# Patient Record
Sex: MALE | Race: WHITE | NOT HISPANIC OR LATINO | Employment: OTHER | URBAN - METROPOLITAN AREA
[De-identification: names, ages, dates, MRNs, and addresses within clinical notes are randomized per-mention and may not be internally consistent; named-entity substitution may affect disease eponyms.]

---

## 2017-05-11 ENCOUNTER — OFFICE VISIT (OUTPATIENT)
Dept: NEUROSURGERY | Facility: CLINIC | Age: 72
End: 2017-05-11

## 2017-05-11 VITALS — BODY MASS INDEX: 33.6 KG/M2 | HEIGHT: 71 IN | WEIGHT: 240 LBS

## 2017-05-11 DIAGNOSIS — G56.02 CARPAL TUNNEL SYNDROME OF LEFT WRIST: Primary | ICD-10-CM

## 2017-05-11 PROBLEM — G56.22 ULNAR NEUROPATHY OF LEFT UPPER EXTREMITY: Status: ACTIVE | Noted: 2017-05-11

## 2017-05-11 PROCEDURE — 99203 OFFICE O/P NEW LOW 30 MIN: CPT | Performed by: NEUROLOGICAL SURGERY

## 2017-05-11 RX ORDER — CEPHALEXIN 500 MG/1
CAPSULE ORAL
Refills: 0 | COMMUNITY
Start: 2017-04-17 | End: 2017-07-18

## 2017-05-11 RX ORDER — TAMSULOSIN HYDROCHLORIDE 0.4 MG/1
CAPSULE ORAL
Refills: 1 | COMMUNITY
Start: 2017-03-30 | End: 2017-07-18

## 2017-05-11 RX ORDER — DOFETILIDE 0.5 MG/1
CAPSULE ORAL
Refills: 3 | COMMUNITY
Start: 2017-05-01

## 2017-05-11 RX ORDER — SPIRONOLACTONE 25 MG/1
25 TABLET ORAL DAILY
Refills: 1 | COMMUNITY
Start: 2017-03-30

## 2017-05-11 RX ORDER — FINASTERIDE 5 MG/1
TABLET, FILM COATED ORAL
Refills: 1 | COMMUNITY
Start: 2017-03-30 | End: 2017-07-18

## 2017-05-11 RX ORDER — VALSARTAN 80 MG/1
80 TABLET ORAL 2 TIMES DAILY
Refills: 5 | COMMUNITY
Start: 2017-05-05

## 2017-05-11 RX ORDER — CARVEDILOL 12.5 MG/1
6.25 TABLET ORAL 2 TIMES DAILY WITH MEALS
Refills: 1 | COMMUNITY
Start: 2017-03-30

## 2017-05-11 RX ORDER — CYCLOBENZAPRINE HCL 10 MG
TABLET ORAL
Refills: 0 | COMMUNITY
Start: 2017-03-09 | End: 2017-07-18

## 2017-05-11 RX ORDER — ATORVASTATIN CALCIUM 40 MG/1
20 TABLET, FILM COATED ORAL NIGHTLY
Refills: 1 | COMMUNITY
Start: 2017-03-30

## 2017-06-01 ENCOUNTER — OFFICE VISIT (OUTPATIENT)
Dept: NEUROSURGERY | Facility: CLINIC | Age: 72
End: 2017-06-01

## 2017-06-01 ENCOUNTER — HOSPITAL ENCOUNTER (OUTPATIENT)
Dept: GENERAL RADIOLOGY | Facility: HOSPITAL | Age: 72
Discharge: HOME | End: 2017-06-01
Attending: NEUROLOGICAL SURGERY | Admitting: NEUROLOGICAL SURGERY

## 2017-06-01 VITALS — BODY MASS INDEX: 33.88 KG/M2 | WEIGHT: 242 LBS | HEIGHT: 71 IN | TEMPERATURE: 98.6 F

## 2017-06-01 DIAGNOSIS — G56.22 ULNAR NEUROPATHY OF LEFT UPPER EXTREMITY: ICD-10-CM

## 2017-06-01 DIAGNOSIS — G56.02 CARPAL TUNNEL SYNDROME OF LEFT WRIST: Primary | ICD-10-CM

## 2017-06-01 PROCEDURE — 99213 OFFICE O/P EST LOW 20 MIN: CPT | Performed by: NEUROLOGICAL SURGERY

## 2017-06-01 PROCEDURE — 73110 X-RAY EXAM OF WRIST: CPT

## 2017-06-01 RX ORDER — TRIAMCINOLONE ACETONIDE 1 MG/G
CREAM TOPICAL
Refills: 0 | COMMUNITY
Start: 2017-04-17 | End: 2017-07-18

## 2017-06-01 RX ORDER — FUROSEMIDE 20 MG/1
20 TABLET ORAL AS NEEDED
Refills: 5 | COMMUNITY
Start: 2017-03-30

## 2017-06-01 NOTE — PATIENT INSTRUCTIONS
Carpal Tunnel Syndrome  Carpal tunnel syndrome is a condition that causes pain in your hand and arm. The carpal tunnel is a narrow area that is on the palm side of your wrist. Repeated wrist motion or certain diseases may cause swelling in the tunnel. This swelling can pinch the main nerve in the wrist (median nerve).   HOME CARE  If You Have a Splint:  · Wear it as told by your doctor. Remove it only as told by your doctor.  · Loosen the splint if your fingers:  ¨ Become numb and tingle.  ¨ Turn blue and cold.  · Keep the splint clean and dry.  General Instructions   · Take over-the-counter and prescription medicines only as told by your doctor.  · Rest your wrist from any activity that may be causing your pain. If needed, talk to your employer about changes that can be made in your work, such as getting a wrist pad to use while typing.  · If directed, apply ice to the painful area:    Put ice in a plastic bag.    Place a towel between your skin and the bag.    Leave the ice on for 20 minutes, 2-3 times per day.  · Keep all follow-up visits as told by your doctor. This is important.  · Do any exercises as told by your doctor, physical therapist, or occupational therapist.  GET HELP IF:  · You have new symptoms.  · Medicine does not help your pain.  · Your symptoms get worse.     This information is not intended to replace advice given to you by your health care provider. Make sure you discuss any questions you have with your health care provider.     Document Released: 12/06/2012 Document Revised: 09/07/2016 Document Reviewed: 05/04/2016  [employer] Interactive Patient Education ©2017 [employer]    Carpal Tunnel Release  Carpal tunnel release is a surgical procedure to relieve numbness and pain in your hand that are caused by carpal tunnel syndrome. Your carpal tunnel is a narrow, hollow space in your wrist. It passes between your wrist bones and a band of connective tissue (transverse carpal ligament). The nerve  that supplies most of your hand (median nerve) passes through this space, and so do the connections between your fingers and the muscles of your arm (tendons). Carpal tunnel syndrome makes this space swell and become narrow, and this causes pain and numbness.  In carpal tunnel release surgery, a surgeon cuts through the transverse carpal ligament to make more room in the carpal tunnel space. You may have this surgery if other types of treatment have not worked.  LET YOUR HEALTH CARE PROVIDER KNOW ABOUT:  · Any allergies you have.  · All medicines you are taking, including vitamins, herbs, eye drops, creams, and over-the-counter medicines.  · Previous problems you or members of your family have had with the use of anesthetics.  · Any blood disorders you have.  · Previous surgeries you have had.  · Medical conditions you have.  RISKS AND COMPLICATIONS  Generally, this is a safe procedure. However, problems may occur, including:  · Bleeding.  · Infection.  · Injury to the median nerve.  · Need for additional surgery.  BEFORE THE PROCEDURE  · Ask your health care provider about:    Changing or stopping your regular medicines. This is especially important if you are taking diabetes medicines or blood thinners.    Taking medicines such as aspirin and ibuprofen. These medicines can thin your blood. Do not take these medicines before your procedure if your health care provider instructs you not to.  · Do not eat or drink anything after midnight on the night before the procedure or as directed by your health care provider.  · Plan to have someone take you home after the procedure.  PROCEDURE  · An IV tube may be inserted into a vein.  · You will be given one of the following:    A medicine that numbs the wrist area (local anesthetic). You may also be given a medicine to make you relax (sedative).    A medicine that makes you go to sleep (general anesthetic).  · Your arm, hand, and wrist will be cleaned with a germ-killing  solution (antiseptic).  · Your surgeon will make a surgical cut (incision) over the palm side of your wrist. The surgeon will pull aside the skin of your wrist to expose the carpal tunnel space.  · The surgeon will cut the transverse carpal ligament.  · The edges of the incision will be closed with stitches (sutures) or staples.  · A bandage (dressing) will be placed over your wrist and wrapped around your hand and wrist.  AFTER THE PROCEDURE  · You may spend some time in a recovery area.  · Your blood pressure, heart rate, breathing rate, and blood oxygen level will be monitored often until the medicines you were given have worn off.  · You will likely have some pain. You will be given pain medicine.  · You may need to wear a splint or a wrist brace over your dressing.     This information is not intended to replace advice given to you by your health care provider. Make sure you discuss any questions you have with your health care provider.     Document Released: 03/09/2005 Document Revised: 04/10/2017 Document Reviewed: 08/05/2015  [employer] Interactive Patient Education ©2017 [employer]  Carpal Tunnel Release, Care After  Refer to this sheet in the next few weeks. These instructions provide you with information about caring for yourself after your procedure. Your health care provider may also give you more specific instructions. Your treatment has been planned according to current medical practices, but problems sometimes occur. Call your health care provider if you have any problems or questions after your procedure.  WHAT TO EXPECT AFTER THE PROCEDURE  After your procedure, it is typical to have the following:  · Pain.  · Numbness.  · Tingling.  · Swelling.  · Stiffness.  · Bruising.  HOME CARE INSTRUCTIONS  · Take medicines only as directed by your health care provider.  · There are many different ways to close and cover an incision, including stitches (sutures), skin glue, and adhesive strips. Follow your  health care provider's instructions about:    Incision care.    Bandage (dressing) changes and removal.    Incision closure removal.  · Wear a splint or a brace as directed by your surgeon. You may need to do this for 2-3 weeks.  · Keep your hand raised (elevated) above the level of your heart while you are resting. Move your fingers often.  · Avoid activities that cause hand pain.  · Ask your surgeon when you can start to do all of your usual activities again, such as:    Driving.    Returning to work.    Bathing and swimming.  · Keep all follow-up visits as directed by your health care provider. This is important. You may need physical therapy for several months to speed healing and regain movement.  SEEK MEDICAL CARE IF:  · You have drainage, redness, swelling, or pain at your incision site.  · You have a fever.  · You have chills.  · Your pain medicine is not working.  · Your symptoms do not go away after 2 months.  · Your symptoms go away and then return.  SEEK IMMEDIATE MEDICAL CARE IF:  · You have pain or numbness that is getting worse.  · Your fingers change color.  · You are not able to move your fingers.     This information is not intended to replace advice given to you by your health care provider. Make sure you discuss any questions you have with your health care provider.     Document Released: 07/07/2006 Document Revised: 01/08/2016 Document Reviewed: 08/05/2015  [employer] Interactive Patient Education ©2017 [employer]      * Stop Xarelto 3 days prior to surgery *     If you have any questions in regards to your visit with  today, please do not hesitate to contact our office. Ask to speak with a [facility] for any clinical questions you may have.    Ronit Leslie, MISTY    [phone]

## 2017-06-01 NOTE — PROGRESS NOTES
Subjective   Lauro Rice is a 71 y.o. male who presents for follow up of left hand numbness and weakness.     The patient has had a progressive numbness and weakness of  in his left hand for the past year.  EMG NCV study in August 2016 showed findings in the left hand involving both the median and ulnar nerve.  The principal symptoms are in the median nerve distribution indicating carpal tunnel syndrome.  There is a possibility that there is also a ulnar nerve entrapment at the wrist level.    X-rays of his left wrist looked normal, even though he has history of a fracture from a basketball injury and surgery on that wrist on in the past.    The following portions of the patient's history were reviewed, updated as appropriate and approved: allergies, current medications, past family history, past medical history, past social history, past surgical history, review of systems and problem list.     Review of Systems   Constitutional: Negative for activity change, appetite change, chills, diaphoresis, fatigue, fever and unexpected weight change.   HENT: Negative for congestion, dental problem, drooling, ear discharge, ear pain, facial swelling, hearing loss, mouth sores, nosebleeds, postnasal drip, rhinorrhea, sinus pressure, sneezing, sore throat, tinnitus, trouble swallowing and voice change.    Eyes: Negative for photophobia, pain, discharge, redness, itching and visual disturbance.   Respiratory: Negative for apnea, cough, choking, chest tightness, shortness of breath, wheezing and stridor.    Cardiovascular: Negative for chest pain, palpitations and leg swelling.   Gastrointestinal: Negative for abdominal distention, abdominal pain, anal bleeding, blood in stool, constipation, diarrhea, nausea, rectal pain and vomiting.   Endocrine: Negative for cold intolerance, heat intolerance, polydipsia, polyphagia and polyuria.   Genitourinary: Negative for decreased urine volume, difficulty urinating, dysuria,  enuresis, flank pain, frequency, genital sores, hematuria and urgency.   Musculoskeletal: Negative for arthralgias, back pain, gait problem, joint swelling, myalgias, neck pain and neck stiffness.   Skin: Negative for color change, pallor, rash and wound.   Allergic/Immunologic: Negative for environmental allergies, food allergies and immunocompromised state.   Neurological: Negative for dizziness, tremors, seizures, syncope, facial asymmetry, speech difficulty, weakness, light-headedness, numbness and headaches.   Hematological: Negative for adenopathy. Does not bruise/bleed easily.   Psychiatric/Behavioral: Negative for agitation, behavioral problems, confusion, decreased concentration, dysphoric mood, hallucinations, self-injury, sleep disturbance and suicidal ideas. The patient is not nervous/anxious and is not hyperactive.    All other systems reviewed and are negative.      Objective    NEUROLOGICAL EXAMINATION:      Numbness of the left thumb, index and middle finger.  Weakness of the  of the left hand with interosseous atrophy between the thumb and index finger.    Assessment   Left carpal tunnel syndrome.  Possible left ulnar neuropathy at the wrist.       Plan   Left carpal tunnel release.  We will then see how symptoms resolve, since ulnar nerve release may not be necessary if carpal tunnel release is successful in relieving symptoms.        Adrián Hayward MD

## 2017-07-11 DIAGNOSIS — Z22.322 CARRIER OR SUSPECTED CARRIER OF METHICILLIN RESISTANT STAPHYLOCOCCUS AUREUS: ICD-10-CM

## 2017-07-11 DIAGNOSIS — R79.9 ABNORMAL FINDING OF BLOOD CHEMISTRY: ICD-10-CM

## 2017-07-11 DIAGNOSIS — G56.02 CARPAL TUNNEL SYNDROME OF LEFT WRIST: Primary | ICD-10-CM

## 2017-07-11 RX ORDER — CHLORHEXIDINE GLUCONATE 4 G/100ML
SOLUTION TOPICAL
Qty: 120 ML
Start: 2017-07-11 | End: 2017-07-18

## 2017-07-11 NOTE — H&P
HISTORY AND PHYSICAL for SURGERY    Referring Provider: Stephen Stephens DO    Chief complaint: Left hand numbness and weakness    Subjective .   History of present illness:    The patient has had a progressive numbness and weakness of  in his left hand for the past year. EMG NCV study in August 2016 showed findings in the left hand involving both the median and ulnar nerve. The principal symptoms are in the median nerve distribution indicating carpal tunnel syndrome. There is a possibility that there is also a ulnar nerve entrapment at the wrist level.     X-rays of his left wrist looked normal, even though he has history of a fracture from a basketball injury and surgery on that wrist on in the past.    History  Past Medical History:   Diagnosis Date   • A-fib    • Arthritis    • Heart disease    • Hypertension       Past Surgical History:   Procedure Laterality Date   • REPLACEMENT TOTAL KNEE Bilateral     family history includes Cancer in his father and mother.   Social History     Social History   • Marital status:      Spouse name: N/A   • Number of children: N/A   • Years of education: N/A     Social History Main Topics   • Smoking status: Never Smoker   • Smokeless tobacco: Not on file   • Alcohol use No   • Drug use: No   • Sexual activity: Defer     Other Topics Concern   • Not on file     Social History Narrative       Allergies:  Red dye and Sulfa antibiotics    Objective   Review of Systems  Constitutional: Negative for activity change, appetite change, chills, diaphoresis, fatigue, fever and unexpected weight change.   HENT: Negative for congestion, dental problem, drooling, ear discharge, ear pain, facial swelling, hearing loss, mouth sores, nosebleeds, postnasal drip, rhinorrhea, sinus pressure, sneezing, sore throat, tinnitus, trouble swallowing and voice change.   Eyes: Negative for photophobia, pain, discharge, redness, itching and visual disturbance.   Respiratory: Negative for apnea,  cough, choking, chest tightness, shortness of breath, wheezing and stridor.   Cardiovascular: Negative for chest pain, palpitations and leg swelling.   Gastrointestinal: Negative for abdominal distention, abdominal pain, anal bleeding, blood in stool, constipation, diarrhea, nausea, rectal pain and vomiting.   Endocrine: Negative for cold intolerance, heat intolerance, polydipsia, polyphagia and polyuria.   Genitourinary: Negative for decreased urine volume, difficulty urinating, dysuria, enuresis, flank pain, frequency, genital sores, hematuria and urgency.   Musculoskeletal: Negative for arthralgias, back pain, gait problem, joint swelling, myalgias, neck pain and neck stiffness.   Skin: Negative for color change, pallor, rash and wound.   Allergic/Immunologic: Negative for environmental allergies, food allergies and immunocompromised state.   Neurological: Negative for dizziness, tremors, seizures, syncope, facial asymmetry, speech difficulty, weakness, light-headedness, numbness and headaches.   Hematological: Negative for adenopathy. Does not bruise/bleed easily.   Psychiatric/Behavioral: Negative for agitation, behavioral problems, confusion, decreased concentration, dysphoric mood, hallucinations, self-injury, sleep disturbance and suicidal ideas. The patient is not nervous/anxious and is not hyperactive.   All other systems reviewed and are negative.    Physical Exam:  NEUROLOGICAL EXAMINATION:      Numbness of the left thumb, index and middle finger. Weakness of the  of the left hand with interosseous atrophy between the thumb and index finger.      Results Review:  EMG NCV study in August 2016 showed findings in the left hand involving both the median and ulnar nerve    Assessment/Plan   Assessment       Left carpal tunnel syndrome. Possible left ulnar neuropathy at the wrist.           Plan       Left carpal tunnel release. We will then see how symptoms resolve, since ulnar nerve release may not be  necessary if carpal tunnel release is successful in relieving symptoms.      Kelly Jiang PA-C  On behalf of Adrián Hayward MD  07/11/17  3:03 PM

## 2017-07-18 ENCOUNTER — APPOINTMENT (OUTPATIENT)
Dept: PREADMISSION TESTING | Facility: HOSPITAL | Age: 72
End: 2017-07-18

## 2017-07-18 VITALS — BODY MASS INDEX: 31.82 KG/M2 | HEIGHT: 71 IN | WEIGHT: 227.29 LBS

## 2017-07-18 DIAGNOSIS — R79.9 ABNORMAL FINDING OF BLOOD CHEMISTRY: ICD-10-CM

## 2017-07-18 DIAGNOSIS — G56.02 CARPAL TUNNEL SYNDROME OF LEFT WRIST: ICD-10-CM

## 2017-07-18 DIAGNOSIS — Z22.322 CARRIER OR SUSPECTED CARRIER OF METHICILLIN RESISTANT STAPHYLOCOCCUS AUREUS: ICD-10-CM

## 2017-07-18 LAB
ALBUMIN SERPL-MCNC: 4.3 G/DL (ref 3.2–4.8)
ALBUMIN/GLOB SERPL: 1.5 G/DL (ref 1.5–2.5)
ALP SERPL-CCNC: 68 U/L (ref 25–100)
ALT SERPL W P-5'-P-CCNC: 19 U/L (ref 7–40)
ANION GAP SERPL CALCULATED.3IONS-SCNC: 10 MMOL/L (ref 3–11)
AST SERPL-CCNC: 19 U/L (ref 0–33)
BILIRUB SERPL-MCNC: 0.9 MG/DL (ref 0.3–1.2)
BUN BLD-MCNC: 20 MG/DL (ref 9–23)
BUN/CREAT SERPL: 20 (ref 7–25)
CALCIUM SPEC-SCNC: 11 MG/DL (ref 8.7–10.4)
CHLORIDE SERPL-SCNC: 105 MMOL/L (ref 99–109)
CO2 SERPL-SCNC: 22 MMOL/L (ref 20–31)
CREAT BLD-MCNC: 1 MG/DL (ref 0.6–1.3)
DEPRECATED RDW RBC AUTO: 46.5 FL (ref 37–54)
ERYTHROCYTE [DISTWIDTH] IN BLOOD BY AUTOMATED COUNT: 12.9 % (ref 11.3–14.5)
GFR SERPL CREATININE-BSD FRML MDRD: 74 ML/MIN/1.73
GLOBULIN UR ELPH-MCNC: 2.8 GM/DL
GLUCOSE BLD-MCNC: 97 MG/DL (ref 70–100)
HBA1C MFR BLD: 5.4 % (ref 4.8–5.6)
HCT VFR BLD AUTO: 38.9 % (ref 38.9–50.9)
HGB BLD-MCNC: 12.9 G/DL (ref 13.1–17.5)
MCH RBC QN AUTO: 32.4 PG (ref 27–31)
MCHC RBC AUTO-ENTMCNC: 33.2 G/DL (ref 32–36)
MCV RBC AUTO: 97.7 FL (ref 80–99)
MRSA DNA SPEC QL NAA+PROBE: NEGATIVE
PLATELET # BLD AUTO: 171 10*3/MM3 (ref 150–450)
PMV BLD AUTO: 10.1 FL (ref 6–12)
POTASSIUM BLD-SCNC: 4.5 MMOL/L (ref 3.5–5.5)
PROT SERPL-MCNC: 7.1 G/DL (ref 5.7–8.2)
RBC # BLD AUTO: 3.98 10*6/MM3 (ref 4.2–5.76)
SODIUM BLD-SCNC: 137 MMOL/L (ref 132–146)
WBC NRBC COR # BLD: 5.28 10*3/MM3 (ref 3.5–10.8)

## 2017-07-18 PROCEDURE — 85027 COMPLETE CBC AUTOMATED: CPT | Performed by: NEUROLOGICAL SURGERY

## 2017-07-18 PROCEDURE — 93010 ELECTROCARDIOGRAM REPORT: CPT | Performed by: INTERNAL MEDICINE

## 2017-07-18 PROCEDURE — 80053 COMPREHEN METABOLIC PANEL: CPT | Performed by: NEUROLOGICAL SURGERY

## 2017-07-18 PROCEDURE — 83036 HEMOGLOBIN GLYCOSYLATED A1C: CPT | Performed by: NEUROLOGICAL SURGERY

## 2017-07-18 PROCEDURE — 36415 COLL VENOUS BLD VENIPUNCTURE: CPT

## 2017-07-18 PROCEDURE — 93005 ELECTROCARDIOGRAM TRACING: CPT

## 2017-07-18 PROCEDURE — 87641 MR-STAPH DNA AMP PROBE: CPT | Performed by: NEUROLOGICAL SURGERY

## 2017-07-18 RX ORDER — FEBUXOSTAT 40 MG/1
40 TABLET, FILM COATED ORAL DAILY
COMMUNITY

## 2017-07-19 ENCOUNTER — ANESTHESIA (OUTPATIENT)
Dept: PERIOP | Facility: HOSPITAL | Age: 72
End: 2017-07-19

## 2017-07-19 ENCOUNTER — HOSPITAL ENCOUNTER (OUTPATIENT)
Facility: HOSPITAL | Age: 72
Setting detail: HOSPITAL OUTPATIENT SURGERY
Discharge: HOME | End: 2017-07-19
Attending: NEUROLOGICAL SURGERY | Admitting: NEUROLOGICAL SURGERY

## 2017-07-19 ENCOUNTER — ANESTHESIA EVENT (OUTPATIENT)
Dept: PERIOP | Facility: HOSPITAL | Age: 72
End: 2017-07-19

## 2017-07-19 VITALS
HEIGHT: 71 IN | WEIGHT: 227 LBS | DIASTOLIC BLOOD PRESSURE: 93 MMHG | TEMPERATURE: 98.1 F | HEART RATE: 59 BPM | OXYGEN SATURATION: 96 % | RESPIRATION RATE: 20 BRPM | SYSTOLIC BLOOD PRESSURE: 152 MMHG | BODY MASS INDEX: 31.78 KG/M2

## 2017-07-19 DIAGNOSIS — G56.02 CARPAL TUNNEL SYNDROME OF LEFT WRIST: ICD-10-CM

## 2017-07-19 PROCEDURE — 64721 CARPAL TUNNEL SURGERY: CPT | Performed by: NEUROLOGICAL SURGERY

## 2017-07-19 PROCEDURE — 25010000002 PROPOFOL 10 MG/ML EMULSION: Performed by: NURSE ANESTHETIST, CERTIFIED REGISTERED

## 2017-07-19 RX ORDER — FENTANYL CITRATE 50 UG/ML
50 INJECTION, SOLUTION INTRAMUSCULAR; INTRAVENOUS
Status: DISCONTINUED | OUTPATIENT
Start: 2017-07-19 | End: 2017-07-19 | Stop reason: HOSPADM

## 2017-07-19 RX ORDER — SODIUM CHLORIDE, SODIUM LACTATE, POTASSIUM CHLORIDE, CALCIUM CHLORIDE 600; 310; 30; 20 MG/100ML; MG/100ML; MG/100ML; MG/100ML
9 INJECTION, SOLUTION INTRAVENOUS CONTINUOUS
Status: DISCONTINUED | OUTPATIENT
Start: 2017-07-19 | End: 2017-07-19 | Stop reason: HOSPADM

## 2017-07-19 RX ORDER — PROPOFOL 10 MG/ML
VIAL (ML) INTRAVENOUS AS NEEDED
Status: DISCONTINUED | OUTPATIENT
Start: 2017-07-19 | End: 2017-07-19 | Stop reason: SURG

## 2017-07-19 RX ORDER — PROMETHAZINE HYDROCHLORIDE 25 MG/1
25 SUPPOSITORY RECTAL ONCE AS NEEDED
Status: DISCONTINUED | OUTPATIENT
Start: 2017-07-19 | End: 2017-07-19 | Stop reason: HOSPADM

## 2017-07-19 RX ORDER — CEFAZOLIN SODIUM 2 G/100ML
2 INJECTION, SOLUTION INTRAVENOUS ONCE
Status: DISCONTINUED | OUTPATIENT
Start: 2017-07-19 | End: 2017-07-19 | Stop reason: HOSPADM

## 2017-07-19 RX ORDER — PROMETHAZINE HYDROCHLORIDE 25 MG/ML
6.25 INJECTION, SOLUTION INTRAMUSCULAR; INTRAVENOUS ONCE AS NEEDED
Status: DISCONTINUED | OUTPATIENT
Start: 2017-07-19 | End: 2017-07-19 | Stop reason: HOSPADM

## 2017-07-19 RX ORDER — MAGNESIUM HYDROXIDE 1200 MG/15ML
LIQUID ORAL AS NEEDED
Status: DISCONTINUED | OUTPATIENT
Start: 2017-07-19 | End: 2017-07-19 | Stop reason: HOSPADM

## 2017-07-19 RX ORDER — SODIUM CHLORIDE 0.9 % (FLUSH) 0.9 %
1-10 SYRINGE (ML) INJECTION AS NEEDED
Status: DISCONTINUED | OUTPATIENT
Start: 2017-07-19 | End: 2017-07-19 | Stop reason: HOSPADM

## 2017-07-19 RX ORDER — HYDROCODONE BITARTRATE AND ACETAMINOPHEN 7.5; 325 MG/1; MG/1
1 TABLET ORAL ONCE AS NEEDED
Status: DISCONTINUED | OUTPATIENT
Start: 2017-07-19 | End: 2017-07-19

## 2017-07-19 RX ORDER — ONDANSETRON 2 MG/ML
4 INJECTION INTRAMUSCULAR; INTRAVENOUS ONCE AS NEEDED
Status: DISCONTINUED | OUTPATIENT
Start: 2017-07-19 | End: 2017-07-19 | Stop reason: HOSPADM

## 2017-07-19 RX ORDER — PROPOFOL 10 MG/ML
VIAL (ML) INTRAVENOUS CONTINUOUS PRN
Status: DISCONTINUED | OUTPATIENT
Start: 2017-07-19 | End: 2017-07-19 | Stop reason: SURG

## 2017-07-19 RX ORDER — HYDROCODONE BITARTRATE AND ACETAMINOPHEN 5; 325 MG/1; MG/1
1 TABLET ORAL ONCE AS NEEDED
Status: COMPLETED | OUTPATIENT
Start: 2017-07-19 | End: 2017-07-19

## 2017-07-19 RX ORDER — FAMOTIDINE 10 MG/ML
20 INJECTION, SOLUTION INTRAVENOUS ONCE
Status: DISCONTINUED | OUTPATIENT
Start: 2017-07-19 | End: 2017-07-19

## 2017-07-19 RX ORDER — BUPIVACAINE HYDROCHLORIDE 2.5 MG/ML
INJECTION, SOLUTION EPIDURAL; INFILTRATION; INTRACAUDAL AS NEEDED
Status: DISCONTINUED | OUTPATIENT
Start: 2017-07-19 | End: 2017-07-19 | Stop reason: HOSPADM

## 2017-07-19 RX ORDER — PROMETHAZINE HYDROCHLORIDE 25 MG/1
25 TABLET ORAL ONCE AS NEEDED
Status: DISCONTINUED | OUTPATIENT
Start: 2017-07-19 | End: 2017-07-19 | Stop reason: HOSPADM

## 2017-07-19 RX ORDER — LIDOCAINE HYDROCHLORIDE 10 MG/ML
0.5 INJECTION, SOLUTION EPIDURAL; INFILTRATION; INTRACAUDAL; PERINEURAL ONCE AS NEEDED
Status: COMPLETED | OUTPATIENT
Start: 2017-07-19 | End: 2017-07-19

## 2017-07-19 RX ORDER — GLYCOPYRROLATE 0.2 MG/ML
INJECTION INTRAMUSCULAR; INTRAVENOUS AS NEEDED
Status: DISCONTINUED | OUTPATIENT
Start: 2017-07-19 | End: 2017-07-19 | Stop reason: SURG

## 2017-07-19 RX ORDER — HYDROMORPHONE HYDROCHLORIDE 1 MG/ML
0.5 INJECTION, SOLUTION INTRAMUSCULAR; INTRAVENOUS; SUBCUTANEOUS
Status: DISCONTINUED | OUTPATIENT
Start: 2017-07-19 | End: 2017-07-19 | Stop reason: HOSPADM

## 2017-07-19 RX ORDER — FAMOTIDINE 20 MG/1
20 TABLET, FILM COATED ORAL ONCE
Status: COMPLETED | OUTPATIENT
Start: 2017-07-19 | End: 2017-07-19

## 2017-07-19 RX ADMIN — SODIUM CHLORIDE, POTASSIUM CHLORIDE, SODIUM LACTATE AND CALCIUM CHLORIDE 9 ML/HR: 600; 310; 30; 20 INJECTION, SOLUTION INTRAVENOUS at 09:35

## 2017-07-19 RX ADMIN — HYDROCODONE BITARTRATE AND ACETAMINOPHEN 1 TABLET: 5; 325 TABLET ORAL at 14:33

## 2017-07-19 RX ADMIN — PROPOFOL 75 MCG/KG/MIN: 10 INJECTION, EMULSION INTRAVENOUS at 12:57

## 2017-07-19 RX ADMIN — GLYCOPYRROLATE 0.2 MG: 0.2 INJECTION, SOLUTION INTRAMUSCULAR; INTRAVENOUS at 13:16

## 2017-07-19 RX ADMIN — LIDOCAINE HYDROCHLORIDE 0.2 ML: 10 INJECTION, SOLUTION EPIDURAL; INFILTRATION; INTRACAUDAL; PERINEURAL at 09:35

## 2017-07-19 RX ADMIN — PROPOFOL 30 MG: 10 INJECTION, EMULSION INTRAVENOUS at 13:00

## 2017-07-19 RX ADMIN — PROPOFOL 50 MG: 10 INJECTION, EMULSION INTRAVENOUS at 12:57

## 2017-07-19 RX ADMIN — LIDOCAINE HYDROCHLORIDE 0.2 ML: 10 INJECTION, SOLUTION EPIDURAL; INFILTRATION; INTRACAUDAL; PERINEURAL at 09:40

## 2017-07-19 RX ADMIN — FAMOTIDINE 20 MG: 20 TABLET ORAL at 09:53

## 2017-07-19 NOTE — PLAN OF CARE
Problem: Patient Care Overview (Adult)  Goal: Plan of Care Review  Outcome: Ongoing (interventions implemented as appropriate)    07/19/17 0921   Coping/Psychosocial Response Interventions   Plan Of Care Reviewed With patient   Patient Care Overview   Progress improving

## 2017-07-19 NOTE — PLAN OF CARE
Problem: Patient Care Overview (Adult)  Goal: Adult Individualization and Mutuality  Outcome: Ongoing (interventions implemented as appropriate)    07/19/17 0921   Individualization   Patient Specific Preferences none   Patient Specific Goals none   Patient Specific Interventions none   Mutuality/Individual Preferences   What Anxieties, Fears or Concerns Do You Have About Your Health or Care? none   What Questions Do You Have About Your Health or Care? none   What Information Would Help Us Give You More Personalized Care? none

## 2017-07-19 NOTE — BRIEF OP NOTE
CARPAL TUNNEL RELEASE  Procedure Note    Luaro Rice  7/19/2017    Pre-op Diagnosis:   Carpal tunnel syndrome of left wrist [G56.02]    Post-op Diagnosis:     Post-Op Diagnosis Codes:     * Carpal tunnel syndrome of left wrist [G56.02]    Procedure/CPT® Codes:      Procedure(s):  LEFT CARPAL TUNNEL RELEASE    Surgeon(s):  Adrián Hayward MD    Anesthesia: Monitor Anesthesia Care with [facility]    Staff:   Circulator: Deandra Tejada RN; Devyn Lawson RN  Scrub Person: Helen Vallecillo  Nursing Assistant: Michael Maria  Assistant: Kelly Jiang PA-C    Estimated Blood Loss: *No blood loss documented*  Urine Voided: * No values recorded between 7/19/2017 12:08 PM and 7/19/2017  1:29 PM *    Specimens:                * No specimens in log *      Drains:           Findings: Left carpal tunnel narrowing    Complications: None      Adrián Hayward MD     Date: 7/19/2017  Time: 1:33 PM

## 2017-07-19 NOTE — ANESTHESIA POSTPROCEDURE EVALUATION
Patient: Lauro Rice    Procedure Summary     Date Anesthesia Start Anesthesia Stop Room / Location    07/19/17 1252 1340 [facility] / [facility]       Procedure Diagnosis Surgeon Provider    LEFT CARPAL TUNNEL RELEASE (Left Wrist) Carpal tunnel syndrome of left wrist  (Carpal tunnel syndrome of left wrist [G56.02]) MD Jose Beck MD          Anesthesia Type: MAC, [place] block  Last vitals  BP   152/93 (07/19/17 1430)    Temp   98.1 °F (36.7 °C) (07/19/17 1430)    Pulse   59 (07/19/17 1430)   Resp   20 (07/19/17 1430)    SpO2   96 % (07/19/17 1430)      Post Anesthesia Care and Evaluation    Patient location during evaluation: PACU  Patient participation: complete - patient participated  Level of consciousness: awake and alert  Pain score: 0  Pain management: adequate  Airway patency: patent  Anesthetic complications: No anesthetic complications  PONV Status: none  Cardiovascular status: hemodynamically stable and acceptable  Respiratory status: nonlabored ventilation, acceptable and nasal cannula  Hydration status: acceptable

## 2017-07-19 NOTE — PLAN OF CARE
Problem: Perioperative Period (Adult)  Goal: Signs and Symptoms of Listed Potential Problems Will be Absent or Manageable (Perioperative Period)  Outcome: Outcome(s) achieved Date Met:  07/19/17

## 2017-07-19 NOTE — OP NOTE
"OPERATIVE REPORT    DATE OF OPERATION: 7/19/17    PREOPERATIVE DIAGNOSIS: Left carpal tunnel syndrome    POSTOPERATIVE DIAGNOSIS: Left Carpal tunnel syndrome    PROCEDURE PERFORMED: Left carpal tunnel release    SURGEON: Adrián Hayward MD    ASSISTANT: Kelly Jiang PA-C    INDICATIONS: Left hand numbness and weakness with NCV evidence of carpal tunnel compression of the median nerve on the left.    DESCRIPTON OF PROCEDURE: Surgery was performed under local anesthesia using Marcaine supplemented with IV sedation provided by anesthesia.  The right hand and forearm were prepared sterilely, covered with a stockinette, and draped.  The proximal palmar and volar wrist surface were infiltrated with Marcaine and a skin incision made in the palmar crease between the thenar and hyperthenar eminence to the distal wrist crease. The incision was deepened with the #15 blade through the subcutaneous level and bipolar coagulation was used for hemostasis.  Finger retractors were used for exposure.  The palmaris aponeurosis was divided exposing the transverse carpal ligament, and the ligament was divided sharply using the #15 blade and fine tipped scissors.  The ligament was fully divided proximally and distally and confirmed visually. Bipolar coagulation was used for final hemostasis.    The retractors were removed.  Hemostasis was confirmed. The incision was closed with 3-0 Vicryl for the subcutaneous layer and a 3-0 running nylon closure for the skin. The blood loss was less then 10 mL. The incision was dressed with Telfa, gauze and a 1\" Jane roll.    Adrián Hayward M.D.    "

## 2017-07-19 NOTE — INTERVAL H&P NOTE
"H&P reviewed. The patient was examined and there are no changes to the H&P.   BP (!) 177/112 (BP Location: Right arm, Patient Position: Sitting)  Pulse 62  Temp 97.5 °F (36.4 °C) (Temporal Artery )   Resp 18  Ht 71\" (180.3 cm)  Wt 227 lb (103 kg)  SpO2 98%  BMI 31.66 kg/m2   Lungs clear  Heart regular rhythm (states he has been in NSR for 10 days)  Allergies:  Contrast dye; Red dye; and Sulfa antibiotics no allergy to latex  Immunizations  pneumo 2016  Flu 2016  Tetanus ?  Whitney Paris PA-C   7/18/17  10:05  "

## 2017-07-19 NOTE — PLAN OF CARE
Problem: Patient Care Overview (Adult)  Goal: Discharge Needs Assessment  Outcome: Outcome(s) achieved Date Met:  07/19/17

## 2017-07-19 NOTE — ANESTHESIA PREPROCEDURE EVALUATION
Anesthesia Evaluation     Patient summary reviewed and Nursing notes reviewed          Airway   Mallampati: II  TM distance: >3 FB  Neck ROM: full  no difficulty expected  Dental - normal exam     Pulmonary - negative pulmonary ROS and normal exam   Cardiovascular - normal exam    (+) hypertension, dysrhythmias Atrial Fib,       Neuro/Psych- negative ROS  GI/Hepatic/Renal/Endo    (+) obesity,      Musculoskeletal     Abdominal  - normal exam    Bowel sounds: normal.   Substance History - negative use     OB/GYN negative ob/gyn ROS         Other   (+) arthritis       Other Comment: Off xarelto 6 days                                Anesthesia Plan    ASA 3     MAC     intravenous induction   Anesthetic plan and risks discussed with patient.

## 2017-07-24 ENCOUNTER — TELEPHONE (OUTPATIENT)
Dept: NEUROSURGERY | Facility: CLINIC | Age: 72
End: 2017-07-24

## 2017-07-24 NOTE — TELEPHONE ENCOUNTER
Provider:  Mainor   Caller: SELF   Time of call:  1015am   Phone #: [phone]   Surgery:  LEFT CARPAL TUNNEL RELEASE  Surgery Date:  07/19/17  Last visit:   06/01/17  Next visit: 08/10/17-Apol[facility] p/o visit    LEDY:     07/19/2017 Hydrocodone/Acetaminophen  325MG/5MG  1945 30 7 Adrián Hayward [facility] 1    Reason for call:  Pt called in this morning leaving message stating his Left hand fingers are swelling at the knuckles. States he can not move them too well, they are stiff and giving him an achy feeling.    His Right hand fingers are fine.     States he has been doing ice rotations and taking his percocet 7.5mg Q6hrs.    Ice hasn't offered any relief of the swelling.    Incision site itself is perfect- per pt.   No signs of fever, chills or any other problems.     In the meantime I have instructed the pt to take some IBU- 800mg Q4hrs to see if that helps his inflammation.

## 2017-07-24 NOTE — TELEPHONE ENCOUNTER
I spoke with the patient about his symptoms and his swelling, it doesn't appear to be too awfully bad.  He has numbness like he had prior to surgery, wound is dry and intact without any swelling.  I have recommended that he elevate his wrist above the level of his heart, use an ice pack today and keep his arm extended without a bend at the elbow.  He's going to give us a call back in the office in a few hours.

## 2017-07-25 ENCOUNTER — TELEPHONE (OUTPATIENT)
Dept: NEUROSURGERY | Facility: CLINIC | Age: 72
End: 2017-07-25

## 2017-07-25 NOTE — TELEPHONE ENCOUNTER
I had him removed all the dressings, his incision is great. I think with continued conservative measures with elevation and ice, his symptoms  Should resolve. DC

## 2017-07-25 NOTE — TELEPHONE ENCOUNTER
Provider:  Mainor  Caller: patient  Time of call:   9:21  Phone #:  [phone]  Surgery:  L-CTR  Surgery Date:  07-19-17  Last visit:   same  Next visit: 08-10-17    LEDY:         Reason for call:     Patient called and states he has a nose bleed.  Please call.

## 2017-08-10 ENCOUNTER — OFFICE VISIT (OUTPATIENT)
Dept: NEUROSURGERY | Facility: CLINIC | Age: 72
End: 2017-08-10

## 2017-08-10 VITALS
SYSTOLIC BLOOD PRESSURE: 152 MMHG | HEIGHT: 71 IN | BODY MASS INDEX: 31.36 KG/M2 | WEIGHT: 224 LBS | DIASTOLIC BLOOD PRESSURE: 94 MMHG | TEMPERATURE: 96.5 F

## 2017-08-10 DIAGNOSIS — G56.22 ULNAR NEUROPATHY OF LEFT UPPER EXTREMITY: Primary | ICD-10-CM

## 2017-08-10 DIAGNOSIS — G56.02 CARPAL TUNNEL SYNDROME OF LEFT WRIST: ICD-10-CM

## 2017-08-10 PROCEDURE — 99024 POSTOP FOLLOW-UP VISIT: CPT | Performed by: PHYSICIAN ASSISTANT

## 2017-08-10 RX ORDER — METHYLPREDNISOLONE 4 MG/1
TABLET ORAL
Qty: 21 TABLET | Refills: 0 | Status: SHIPPED | OUTPATIENT
Start: 2017-08-10

## 2017-08-10 NOTE — PROGRESS NOTES
Lauro Rice is a 71 y.o. male who presents for a 3 week follow up appointment status post carpal tunnel release that took place on 07/19/2017.     HISTORY OF PRESENT ILLNESS and HOSPITAL COURSE:  The patient has had a progressive numbness and weakness of  in his left hand for the past year. EMG NCV study in August 2016 showed findings in the left hand involving both the median and ulnar nerve. The principal symptoms are in the median nerve distribution indicating carpal tunnel syndrome.  Left hand numbness and weakness with NCV evidence of carpal tunnel compression of the median nerve on the left.     In the post-operative 3 weeks the patient has had good relief of the symptoms experienced prior to surgery.  The surgical incision site is clean, dry, healed and the sutures were removed today in the office without incident.  He states his pain is now 3/10, which is a marked improvement prior to surgery.  He continues to have joint swelling and stiffness of his thumb and index finger as well as interosseous atrophy between the thumb and index finger.  He continues to have numbness of thumb and digits 4 and 5 of the left hand.  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    REVIEW OF SYSTEMS:  Review of Systems  Constitutional: Negative for activity change, appetite change, chills, diaphoresis, fatigue, fever and unexpected weight change.   HENT: Negative for congestion, dental problem, drooling, ear discharge, ear pain, facial swelling, hearing loss, mouth sores, nosebleeds, postnasal drip, rhinorrhea, sinus pressure, sneezing, sore throat, tinnitus, trouble swallowing and voice change.    Eyes: Negative for photophobia, pain, discharge, redness, itching and visual disturbance.   Respiratory: Negative for apnea, cough, choking, chest tightness, shortness of breath, wheezing and stridor.    Cardiovascular: Negative for chest pain,  palpitations and leg swelling.   Gastrointestinal: Negative for abdominal distention, abdominal pain, anal bleeding, blood in stool, constipation, diarrhea, nausea, rectal pain and vomiting.   Endocrine: Negative for cold intolerance, heat intolerance, polydipsia, polyphagia and polyuria.   Genitourinary: Negative for decreased urine volume, difficulty urinating, dysuria, enuresis, flank pain, frequency, genital sores, hematuria and urgency.   Musculoskeletal: Negative for arthralgias, back pain, gait problem, joint swelling, myalgias, neck pain and neck stiffness.   Skin: Negative for color change, pallor, rash and wound.   Allergic/Immunologic: Negative for environmental allergies, food allergies and immunocompromised state.   Neurological: Negative for dizziness, tremors, seizures, syncope, facial asymmetry, speech difficulty, weakness, light-headedness, numbness and headaches.   Hematological: Negative for adenopathy. Does not bruise/bleed easily.   Psychiatric/Behavioral: Negative for agitation, behavioral problems, confusion, decreased concentration, dysphoric mood, hallucinations, self-injury, sleep disturbance and suicidal ideas. The patient is not nervous/anxious and is not hyperactiv  The following portions of the patient's history were reviewed and updated as appropriate: allergies, current medications, past family history, past medical history, past social history, past surgical history and problem list.     PHYSICAL EXAM:  Physical Exam   Constitutional: He is oriented to person, place, and time. He appears well-developed and well-nourished. He is cooperative.   Neck: Trachea normal, full passive range of motion without pain and phonation normal.   Pulmonary/Chest: Effort normal.   Musculoskeletal:        Left hand: He exhibits decreased range of motion and swelling. Decreased sensation noted. Decreased sensation is present in the ulnar distribution. Decreased strength noted.   Neurological: He is alert  and oriented to person, place, and time. He displays atrophy. No cranial nerve deficit.   Skin: Skin is warm, dry and intact.   Psychiatric: He has a normal mood and affect. His behavior is normal. Judgment and thought content normal.        ASSESSMENT AND PLAN:  Mr. Rice is a delightful patient that is doing remarkably well in the postoperative period of time since his recent left carpal tunnel release.  He continues to have some hand and finger stiffness and swelling, especially of the digits 1 and 2 of the left hand.  He has a friend that is a physical therapist and will discuss some exercises that he can do at home to help with strengthening and range of motion.  He has a brace used previously for his symptoms of carpal tunnel syndrome and will utilize it during his upcoming trip to [place].      The patient will return in approximately 8 weeks for the next follow-up appointment with Kelly Jiang PA-C.  I instructed the patient to contact the office if concerns or questions arise following today's appointment.  Additional instructions were given to the patient regarding physical activity and pertinent restrictions at this point in time, which were acknowledged with full understanding by the patient.  It has been a pleasure providing neurosurgical care to this patient and we are grateful for the opportunity.

## 2017-10-12 ENCOUNTER — OFFICE VISIT (OUTPATIENT)
Dept: NEUROSURGERY | Facility: CLINIC | Age: 72
End: 2017-10-12

## 2017-10-12 VITALS
WEIGHT: 230 LBS | SYSTOLIC BLOOD PRESSURE: 142 MMHG | DIASTOLIC BLOOD PRESSURE: 86 MMHG | TEMPERATURE: 96.2 F | HEIGHT: 71 IN | BODY MASS INDEX: 32.2 KG/M2

## 2017-10-12 DIAGNOSIS — G56.02 CARPAL TUNNEL SYNDROME OF LEFT WRIST: Primary | ICD-10-CM

## 2017-10-12 PROCEDURE — 99024 POSTOP FOLLOW-UP VISIT: CPT | Performed by: PHYSICIAN ASSISTANT

## 2017-10-12 NOTE — PROGRESS NOTES
Lauro Rice is a 71 y.o. male who presents for a 3 month follow up appointment status post carpal tunnel release that took place on 07/19/2017    HISTORY OF PRESENT ILLNESS and HOSPITAL COURSE:  The patient has had a progressive numbness and weakness of  in his left hand for the past year. EMG NCV study in August 2016 showed findings in the left hand involving both the median and ulnar nerve. The principal symptoms are in the median nerve distribution indicating carpal tunnel syndrome.  Left hand numbness and weakness with NCV evidence of carpal tunnel compression of the median nerve on the left.  He underwent carpal tunnel release, left, on 07/19/17 and presents for an additional postoperative follow up appointment.  His continues to have numbness of digits 1 and 2 of the left hand as well as the thenar eminence, however his symptoms of pain in the left wrist and hand has resolved.  There is only a mild amount of swelling of the fingers and wrist, left side, which has improved significantly since he was last seen in August for his 3 week postoperative appointment.  He has been using a therapeutic putty to assist with  strengthening exercises.    REVIEW OF SYSTEMS:  Review of Systems   Constitutional: Negative for activity change, chills, fatigue and fever.   Musculoskeletal: Negative for arthralgias, joint swelling and myalgias.   Neurological: Negative for weakness and numbness.       The following portions of the patient's history were reviewed and updated as appropriate: allergies, current medications, past family history, past medical history, past social history, past surgical history and problem list.     PHYSICAL EXAM:  Physical Exam   Constitutional: He is oriented to person, place, and time. He appears well-developed and well-nourished.   Neck: Normal range of motion. Neck supple.   Pulmonary/Chest: Effort normal and breath sounds normal.   Musculoskeletal:        Right hand: He exhibits  decreased range of motion. Decreased strength noted. He exhibits thumb/finger opposition.   Neurological: He is alert and oriented to person, place, and time.   Skin: Skin is warm, dry and intact.   Psychiatric: He has a normal mood and affect. His behavior is normal. Thought content normal.        ASSESSMENT AND PLAN:  Mr. Rice was seen today for final postoperative follow up appointment status post carpal tunnel release that took place on 07/19/17.  His symptoms of pain that he experienced prior to surgery have resolved, however he does continue to have numbness of the thenar eminence and digits 1 and 2 of the left hand.  He will continue to participate in the home exercises that he has been doing and per his request, an order was provided for a transcutaneous electrical nerve stimulation unit to be utilized for additional postoperative recovery and rehabilitation.  This will serve as his final postoperative appointment, however,  I instructed the patient to contact the office if concerns or questions arise following today's appointment.  Additional instructions were given to the patient regarding physical activity and pertinent restrictions at this point in time, which were acknowledged with full understanding by the patient.  It has been a pleasure providing neurosurgical care to this patient and we are grateful for the opportunity.

## 2018-07-23 ENCOUNTER — TRANSCRIBE ORDERS (OUTPATIENT)
Dept: ADMINISTRATIVE | Facility: HOSPITAL | Age: 73
End: 2018-07-23

## 2018-07-23 DIAGNOSIS — R19.05 ABDOMINAL SWELLING, PERIUMBILICAL REGION: Primary | ICD-10-CM

## 2018-07-26 ENCOUNTER — HOSPITAL ENCOUNTER (OUTPATIENT)
Dept: CT IMAGING | Facility: HOSPITAL | Age: 73
Discharge: HOME | End: 2018-07-26
Admitting: FAMILY MEDICINE

## 2018-07-26 DIAGNOSIS — R19.05 ABDOMINAL SWELLING, PERIUMBILICAL REGION: ICD-10-CM

## 2018-07-26 PROCEDURE — 74150 CT ABDOMEN W/O CONTRAST: CPT | Performed by: RADIOLOGY

## 2018-07-26 PROCEDURE — 74150 CT ABDOMEN W/O CONTRAST: CPT

## 2018-07-27 ENCOUNTER — EPISODE CHANGES (OUTPATIENT)
Dept: CASE MANAGEMENT | Facility: OTHER | Age: 73
End: 2018-07-27

## 2018-09-18 ENCOUNTER — HOSPITAL ENCOUNTER (OUTPATIENT)
Dept: GENERAL RADIOLOGY | Facility: HOSPITAL | Age: 73
Discharge: HOME | End: 2018-09-18
Admitting: FAMILY MEDICINE

## 2018-09-18 ENCOUNTER — TRANSCRIBE ORDERS (OUTPATIENT)
Dept: ADMINISTRATIVE | Facility: HOSPITAL | Age: 73
End: 2018-09-18

## 2018-09-18 DIAGNOSIS — I42.0 CONGESTIVE CARDIOMYOPATHY (HCC): ICD-10-CM

## 2018-09-18 DIAGNOSIS — I42.0 CONGESTIVE CARDIOMYOPATHY (HCC): Primary | ICD-10-CM

## 2018-09-18 PROCEDURE — 71046 X-RAY EXAM CHEST 2 VIEWS: CPT

## 2018-09-18 PROCEDURE — 71046 X-RAY EXAM CHEST 2 VIEWS: CPT | Performed by: RADIOLOGY

## 2021-03-23 ENCOUNTER — OFFICE VISIT (OUTPATIENT)
Dept: UROLOGY | Facility: CLINIC | Age: 76
End: 2021-03-23

## 2021-03-23 VITALS — WEIGHT: 226 LBS | BODY MASS INDEX: 31.64 KG/M2 | TEMPERATURE: 97.3 F | HEIGHT: 71 IN

## 2021-03-23 DIAGNOSIS — N28.89 RENAL MASS: Primary | ICD-10-CM

## 2021-03-23 PROCEDURE — 99203 OFFICE O/P NEW LOW 30 MIN: CPT | Performed by: UROLOGY

## 2021-03-23 NOTE — PROGRESS NOTES
Chief Complaint:          Chief Complaint   Patient presents with   • KINDEY MASS       HPI:   75 y.o. male who was Dr. Artis his father-in-law referred from the nephrologist for a renal mass.  He has known chronic kidney disease stage IIIa.  He takes lots of Advil his creatinine went to 2.  He had a PSA of less than 0.1.  He brought films with him.  Indicating serum creatinine is 2.09 A1c is 5.7 PSA is less than 0.1 renal ultrasound shows a normal bladder.  And a questionable tiny angiomyolipoma.  You the ultrasound.  I will recommend an MRI for definitive visualization.  We discussed the fact that this is a very small benign renal mass observation would be indicated in either case      Past Medical History:        Past Medical History:   Diagnosis Date   • A-fib (CMS/HCC)    • Arthritis    • Heart disease    • Hypertension    • Low back pain    • MI, old     possible   • Wears glasses          Current Meds:     Current Outpatient Medications   Medication Sig Dispense Refill   • atorvastatin (LIPITOR) 40 MG tablet Take 20 mg by mouth Every Night.  1   • carvedilol (COREG) 12.5 MG tablet Take 6.25 mg by mouth 2 (Two) Times a Day With Meals.  1   • dofetilide (TIKOSYN) 500 MCG capsule TK 1 C PO BID  3   • febuxostat (ULORIC) 40 MG tablet Take 40 mg by mouth Daily.     • furosemide (LASIX) 20 MG tablet Take 20 mg by mouth As Needed.  5   • MethylPREDNISolone (MEDROL, ZENA,) 4 MG tablet Take as directed on package instructions. 21 tablet 0   • spironolactone (ALDACTONE) 25 MG tablet Take 25 mg by mouth Daily.  1   • valsartan (DIOVAN) 80 MG tablet Take 80 mg by mouth 2 (Two) Times a Day.  5   • rivaroxaban (XARELTO) 20 MG tablet Take 20 mg by mouth Daily With Dinner. Last dose on 7/13/17       No current facility-administered medications for this visit.        Allergies:      Allergies   Allergen Reactions   • Contrast Dye      Given during heart cath caused itching and swelling   • Red Dye      palpitations   • Sulfa  Antibiotics Itching        Past Surgical History:     Past Surgical History:   Procedure Laterality Date   • CARPAL TUNNEL RELEASE Left 7/19/2017    Procedure: LEFT CARPAL TUNNEL RELEASE;  Surgeon: Adrián Hayward MD;  Location: [facility];  Service:    • LEG SURGERY Right     TRIPPED ON SOMETHING, WOUND BECAME INFECTED, I&D WITH PACKING   • REPLACEMENT TOTAL KNEE Bilateral          Social History:     Social History     Socioeconomic History   • Marital status:      Spouse name: Not on file   • Number of children: Not on file   • Years of education: Not on file   • Highest education level: Not on file   Tobacco Use   • Smoking status: Never Smoker   • Smokeless tobacco: Never Used   Substance and Sexual Activity   • Alcohol use: No   • Drug use: No   • Sexual activity: Defer       Family History:     Family History   Problem Relation Age of Onset   • No Known Problems Mother    • No Known Problems Father        Review of Systems:     Review of Systems   Constitutional: Negative.    HENT: Negative.    Eyes: Negative.    Respiratory: Negative.    Cardiovascular: Negative.    Gastrointestinal: Negative.    Endocrine: Negative.    Musculoskeletal: Negative.    Allergic/Immunologic: Negative.    Neurological: Negative.    Hematological: Negative.    Psychiatric/Behavioral: Negative.        Physical Exam:     Physical Exam  Vitals and nursing note reviewed.   Constitutional:       Appearance: He is well-developed.   HENT:      Head: Normocephalic and atraumatic.   Eyes:      Conjunctiva/sclera: Conjunctivae normal.      Pupils: Pupils are equal, round, and reactive to light.   Cardiovascular:      Rate and Rhythm: Normal rate and regular rhythm.      Heart sounds: Normal heart sounds.   Pulmonary:      Effort: Pulmonary effort is normal.      Breath sounds: Normal breath sounds.   Abdominal:      General: Bowel sounds are normal.      Palpations: Abdomen is soft.   Musculoskeletal:         General: Normal range of  motion.      Cervical back: Normal range of motion.   Skin:     General: Skin is warm and dry.   Neurological:      Mental Status: He is alert and oriented to person, place, and time.      Deep Tendon Reflexes: Reflexes are normal and symmetric.   Psychiatric:         Behavior: Behavior normal.         Thought Content: Thought content normal.         Judgment: Judgment normal.         I have reviewed the following portions of the patient's history: allergies, current medications, past family history, past medical history, past social history, past surgical history, problem list and ROS and confirm it's accurate.      Procedure:       Assessment/Plan:   Angiomyolipoma-small renal mass recommend MRI for confirmation purposes will notify patient of same.  Stage IV chronic kidney disease follows with nephrology            Patient's Body mass index is 31.52 kg/m². BMI is above normal parameters. Recommendations include: educational material.              This document has been electronically signed by TREV LEARY MD March 23, 2021 14:39 EDT

## 2021-03-26 PROBLEM — N28.89 RENAL MASS: Status: ACTIVE | Noted: 2021-03-26

## 2024-11-19 ENCOUNTER — OFFICE VISIT (OUTPATIENT)
Dept: NEUROLOGY | Facility: CLINIC | Age: 79
End: 2024-11-19
Payer: MEDICARE

## 2024-11-19 VITALS
WEIGHT: 184 LBS | OXYGEN SATURATION: 97 % | SYSTOLIC BLOOD PRESSURE: 118 MMHG | BODY MASS INDEX: 25.76 KG/M2 | HEART RATE: 62 BPM | DIASTOLIC BLOOD PRESSURE: 88 MMHG | HEIGHT: 71 IN

## 2024-11-19 DIAGNOSIS — R25.1 TREMOR: Primary | ICD-10-CM

## 2024-11-19 PROCEDURE — 1159F MED LIST DOCD IN RCRD: CPT | Performed by: NURSE PRACTITIONER

## 2024-11-19 PROCEDURE — 99204 OFFICE O/P NEW MOD 45 MIN: CPT | Performed by: NURSE PRACTITIONER

## 2024-11-19 PROCEDURE — 1160F RVW MEDS BY RX/DR IN RCRD: CPT | Performed by: NURSE PRACTITIONER

## 2024-11-19 RX ORDER — HYDROCODONE BITARTRATE AND ACETAMINOPHEN 7.5; 325 MG/1; MG/1
0.5 TABLET ORAL
COMMUNITY

## 2024-11-19 RX ORDER — PREDNISONE 20 MG/1
20 TABLET ORAL
COMMUNITY

## 2024-11-19 RX ORDER — ALLOPURINOL 300 MG/1
300 TABLET ORAL DAILY
COMMUNITY

## 2024-11-19 RX ORDER — METOPROLOL SUCCINATE 50 MG/1
50 TABLET, EXTENDED RELEASE ORAL DAILY
COMMUNITY
Start: 2024-03-04 | End: 2025-03-04

## 2024-11-19 RX ORDER — CYCLOBENZAPRINE HCL 10 MG
10 TABLET ORAL
COMMUNITY

## 2024-11-19 RX ORDER — TAMSULOSIN HYDROCHLORIDE 0.4 MG/1
1 CAPSULE ORAL DAILY
COMMUNITY

## 2024-11-19 RX ORDER — CARBIDOPA AND LEVODOPA 25; 100 MG/1; MG/1
1 TABLET ORAL 3 TIMES DAILY
Qty: 90 TABLET | Refills: 3 | Status: SHIPPED | OUTPATIENT
Start: 2024-11-19

## 2024-11-19 RX ORDER — FINASTERIDE 5 MG/1
5 TABLET, FILM COATED ORAL DAILY
COMMUNITY

## 2024-11-19 NOTE — PROGRESS NOTES
Neuro Office Visit      Encounter Date: 2024   Patient Name: Lauro Rice  : 1945   MRN: [id number]   PCP:  Stephen Stephens DO     Chief Complaint:    Chief Complaint   Patient presents with    Tremors       History of Present Illness: Lauro Rice is a 79 y.o. male who is here today in Neurology for tremor.  History of Present Illness  The patient presents for evaluation of tremors. He is accompanied by his wife.    He has been experiencing tremors for over a year, initially in his right hand, but now also in his left. Tremors have progressively worsened, making it difficult for him to hold objects, use cutlery, and perform tasks such as buttoning, zipping, and tying. He also struggles with opening bottles or jars. He does not notice any tremors upon waking in the morning. He denies any facial or chin tremors.    He reports having vivid dreams that sometimes wake him up, but he does not act them out.     He experiences stiffness in his shoulders and back, which he attributes to arthritis.     His walking is affected by his back condition, causing him to shuffle due to fear of falling.     He feels frozen when transitioning from standing to moving.     He denies any difficulty swallowing.    He notes a deterioration in his vision over the past 6 to 9 months and has an upcoming appointment with an optic nerve specialist. His reading comprehension has declined, and his handwriting has become smaller and less legible.    He has been experiencing mild depression, which is unusual for him.          Subjective      Past Medical History:   Past Medical History:   Diagnosis Date    A-fib     Arthritis     Heart disease     Hypertension     Low back pain     MI, old     possible    Wears glasses        Past Surgical History:   Past Surgical History:   Procedure Laterality Date    CARPAL TUNNEL RELEASE Left 2017    Procedure: LEFT CARPAL TUNNEL RELEASE;  Surgeon: Adrián Hayward MD;  Location:   [facility];  Service:     LEG SURGERY Right     TRIPPED ON SOMETHING, WOUND BECAME INFECTED, I&D WITH PACKING    REPLACEMENT TOTAL KNEE Bilateral        Family History:   Family History   Problem Relation Age of Onset    No Known Problems Mother     No Known Problems Father        Social History:   Social History     Socioeconomic History    Marital status:    Tobacco Use    Smoking status: Never    Smokeless tobacco: Never   Substance and Sexual Activity    Alcohol use: No    Drug use: No    Sexual activity: Defer       Medications:     Current Outpatient Medications:     allopurinol (ZYLOPRIM) 300 MG tablet, Take 1 tablet by mouth Daily., Disp: , Rfl:     atorvastatin (LIPITOR) 40 MG tablet, Take 0.5 tablets by mouth Every Night., Disp: , Rfl: 1    cyclobenzaprine (FLEXERIL) 10 MG tablet, Take 1 tablet by mouth., Disp: , Rfl:     finasteride (PROSCAR) 5 MG tablet, Take 1 tablet by mouth Daily., Disp: , Rfl:     furosemide (LASIX) 20 MG tablet, Take 1 tablet by mouth As Needed., Disp: , Rfl: 5    HYDROcodone-acetaminophen (NORCO) 7.5-325 MG per tablet, 0.5 tablets., Disp: , Rfl:     metoprolol succinate XL (TOPROL-XL) 50 MG 24 hr tablet, Take 1 tablet by mouth Daily., Disp: , Rfl:     predniSONE (DELTASONE) 20 MG tablet, Take 1 tablet by mouth., Disp: , Rfl:     rivaroxaban (XARELTO) 15 MG tablet, Take 1 tablet by mouth Daily., Disp: , Rfl:     tamsulosin (FLOMAX) 0.4 MG capsule 24 hr capsule, Take 1 capsule by mouth Daily., Disp: , Rfl:     carbidopa-levodopa (SINEMET)  MG per tablet, Take 1 tablet by mouth 3 (Three) Times a Day., Disp: 90 tablet, Rfl: 3    Allergies:   Allergies   Allergen Reactions    Contrast Dye (Echo Or Unknown Ct/Mr)      Given during heart cath caused itching and swelling    Red Dye #40 (Allura Red)      palpitations    Sulfa Antibiotics Itching    Iodinated Contrast Media Swelling       PHQ-9 Total Score:     STEADI Fall Risk Assessment was completed, and patient is at HIGH risk  "for falls. Assessment completed on:11/19/2024    Objective     Physical Exam:     Neurological Exam  Mental Status  Awake, alert and oriented to person, place and time. Recent and remote memory are intact. Speech is normal. Language is fluent with no aphasia. Attention and concentration are normal. Fund of knowledge is appropriate for level of education.    Cranial Nerves  CN II: Visual acuity is normal.  CN III, IV, VI: Extraocular movements intact bilaterally. Pupils equal round and reactive to light bilaterally.  CN V: Facial sensation is normal.  CN VII: Full and symmetric facial movement.  CN IX, X: Palate elevates symmetrically  CN XI: Shoulder shrug strength is normal.  CN XII: Tongue midline without atrophy or fasciculations.    Motor  Normal muscle bulk throughout. No fasciculations present. Normal muscle tone. Strength is 5/5 throughout all four extremities.    Sensory  Sensation is intact to light touch, pinprick, vibration and proprioception in all four extremities.    Reflexes                                            Right                      Left  Brachioradialis                    2+                         2+  Biceps                                 2+                         2+  Triceps                                2+                         2+  Finger flex                           2+                         2+  Hamstring                            2+                         2+  Patellar                                2+                         2+  Achilles                                2+                         2+    Coordination    Finger-to-nose, rapid alternating movements and heel-to-shin normal bilaterally without dysmetria.    Gait  Casual gait: Shuffling gait.        Vital Signs:   Vitals:    11/19/24 1350   BP: 118/88   Pulse: 62   SpO2: 97%   Weight: 83.5 kg (184 lb)   Height: 180.3 cm (71\")     Body mass index is 25.66 kg/m².     Results:   Results       Imaging:   CT Head Without " Contrast    Result Date: 8/27/2024   1. No acute intracranial hematoma or hemorrhagic parenchymal contusion.  2. Acute bilateral scalp injury. No acute subjacent calvarial fracture.  Created by: Hardeep Murray MD  Signed by: Hardeep Murray MD  Signed on: 8/27/2024 5:58 PM  Location: [id number]            CT Maxillofacial Without Contrast    Result Date: 8/27/2024   1. No acutely displaced maxillofacial fracture.  2. Advanced cervical spondylosis results in multiple levels of at least moderate spinal canal stenosis and moderate to high-grade foraminal stenosis.  Created by: Hardeep Murray MD  Signed by: Hardeep Murray MD  Signed on: 8/27/2024 5:53 PM  Location: [id number]            XR Chest 1 View    Result Date: 8/27/2024   Airspace opacities in the medial right lung base consistent with atelectasis or infiltrate.  Stable prominence of the cardiac silhouette.  Created by: Dwight Jordan  Signed by: Dwight Jordan  Signed on: 8/27/2024 5:15 PM  Location: [id number]            CT Abdomen Pelvis Without Contrast    Result Date: 8/27/2024       1.   No acute posttraumatic findings in the abdomen or pelvis on unenhanced exam.      2.   Large right inguinal hernia containing peritoneal fat and a segment of the right ureter. No hydronephrosis.  3. Dilated ascending aorta measuring 4.3 cm.  4. Enlarged main pulmonary artery, which is associated with pulmonary arterial hypertension.       Created by: Gabriel Webber MD  Signed by: Gabriel Webber MD  Signed on: 8/27/2024 4:20 PM  Location: [id number]            XR Hand 3+ View Right    Result Date: 8/27/2024   1. No acute fracture or dislocation.  2. Osteoarthrosis.  3. Soft tissue swelling  4. Multifocal primarily periarticular soft tissue calcifications. Differential Diagnostic considerations to include calcinosis of renal failure in the appropriate clinical setting.  5. No radiopaque foreign body  Created by: Michel Cobb MD  Signed by: Michel Cobb MD  Signed on: 8/27/2024 3:46 PM  Location:  "[id number]            XR Wrist 3+ View Right    Result Date: 8/27/2024   1. No acute fracture.  2. Bulky periarticular calcifications that can be associated with hydroxyapatite deposition disease and/or gout.  Created by: Stephen Urbina DO  Signed by: Stephen Urbina DO  Signed on: 8/27/2024 3:42 PM  Location: [place] [id number]               Labs:   No results found for: \"CMP\", \"PROTEIN\", \"ANTIMOGAB\", \"LRGLBC1MWID\", \"JCVRESULT\", \"QUANTTBGOLD\", \"CBCDIF\", \"IGGALBSER\"     Assessment / Plan      Assessment/Plan:   Diagnoses and all orders for this visit:    1. Tremor (Primary)  Comments:  Start carbidopa-levodopa    Other orders  -     carbidopa-levodopa (SINEMET)  MG per tablet; Take 1 tablet by mouth 3 (Three) Times a Day.  Dispense: 90 tablet; Refill: 3         Assessment & Plan  1. Tremors.  The symptoms are suggestive of Parkinson's disease, although the presence of arthritis complicates the diagnosis. A DaTscan was recommended for further evaluation. A trial of carbidopa/levodopa 25/100 mg three times daily was initiated, with the first dose to be taken upon waking. Potential side effects, including nausea and stomach upset, were discussed. Feedback on the effectiveness of the medication will be provided after two weeks.    2. Depression.  He has experienced a little depression recently, which he has not had before. This is likely due to the accumulation of various health issues and the impact on his daily activities.    3. Arthritis.  The presence of arthritis in his hands and back complicates the diagnosis of Parkinson's disease. He has significant arthritis symptoms that affect his ability to perform daily tasks.    4. Atrial Fibrillation.  He has a history of atrial fibrillation, which comes and goes. His current medication is not working, and he is scheduled to see another doctor in February for further evaluation.    5. Vision Changes.  He has noticed worsening vision over the past 6 to 9 months. An " appointment with an optic nerve doctor is scheduled for this Thursday.    Follow-up  Return in 3 months for follow-up.    Patient Education:     Reviewed medications, potential side effects and signs and symptoms to report. Discussed risk versus benefits of treatment plan with patient and/or family-including medications, labs and radiology that may be ordered. Addressed questions and concerns during visit. Patient and/or family verbalized understanding and agree with plan. Instructed to call the office with any questions and report to ER with any life-threatening symptoms.     Follow Up:   Return in about 3 months (around 2/19/2025).    I spent 45 minutes caring for Lauro on this date of service. This time includes time spent by me in the following activities: preparing for the visit, reviewing tests, performing a medically appropriate examination and/or evaluation, counseling and educating the patient/family/caregiver, documenting information in the medical record, and ordering medications.        During this visit the following were done:  Labs Reviewed [x]    Labs Ordered []    Radiology Reports Reviewed [x]    Radiology Ordered []    PCP Records Reviewed [x]    Referring Provider Records Reviewed [x]    ER Records Reviewed []    Hospital Records Reviewed []    History Obtained From Family []    Radiology Images Reviewed []    Other Reviewed []    Records Requested []      Patient or patient representative verbalized consent for the use of Ambient Listening during the visit with  DEON Avila for chart documentation. 11/19/2024  15:59 EST    DEON Avila   [facility] NE[facility]  [address]  [facility] [address]  [phone]

## 2025-04-01 ENCOUNTER — OFFICE VISIT (OUTPATIENT)
Dept: NEUROLOGY | Facility: CLINIC | Age: 80
End: 2025-04-01
Payer: MEDICARE

## 2025-04-01 VITALS
BODY MASS INDEX: 24.64 KG/M2 | HEIGHT: 71 IN | SYSTOLIC BLOOD PRESSURE: 98 MMHG | WEIGHT: 176 LBS | DIASTOLIC BLOOD PRESSURE: 58 MMHG | OXYGEN SATURATION: 95 % | HEART RATE: 64 BPM

## 2025-04-01 DIAGNOSIS — R25.1 TREMOR: Primary | ICD-10-CM

## 2025-04-01 PROCEDURE — 1160F RVW MEDS BY RX/DR IN RCRD: CPT | Performed by: NURSE PRACTITIONER

## 2025-04-01 PROCEDURE — 99214 OFFICE O/P EST MOD 30 MIN: CPT | Performed by: NURSE PRACTITIONER

## 2025-04-01 PROCEDURE — 1159F MED LIST DOCD IN RCRD: CPT | Performed by: NURSE PRACTITIONER

## 2025-04-01 RX ORDER — MUPIROCIN 20 MG/G
OINTMENT TOPICAL 3 TIMES DAILY
COMMUNITY
Start: 2025-03-26 | End: 2025-04-05

## 2025-04-01 RX ORDER — ERYTHROMYCIN 5 MG/G
OINTMENT OPHTHALMIC
COMMUNITY

## 2025-04-01 RX ORDER — CARBIDOPA AND LEVODOPA 25; 100 MG/1; MG/1
2 TABLET ORAL 3 TIMES DAILY
Qty: 180 TABLET | Refills: 3 | Status: SHIPPED | OUTPATIENT
Start: 2025-04-01

## 2025-04-01 NOTE — PROGRESS NOTES
Neuro Office Visit      Encounter Date: 2025   Patient Name: Lauro Rice  : 1945   MRN: [id number]   PCP:  Stephen Stephens DO     Chief Complaint:    Chief Complaint   Patient presents with    Tremors       History of Present Illness: Lauro Rice is a 79 y.o. male who is here today in Neurology for tremor.    Last visit with me on 2024 started carbidopa-levodopa.  History of Present Illness  Accompanied by his wife.    Tremors persist and are managed with carbidopa-levadopa, though the midday dose is sometimes forgotten.     Initially, the medication seemed effective, but its efficacy has diminished over time.     No difficulty with swallowing is reported, but his voice intermittently becomes soft.     He experiences vivid dreams and occasional disorientation upon waking.     Gait remains largely unchanged, with shuffling and a slight forward lean noted while walking. Back discomfort is also reported and he feels that the pain could be responsible for the forward lean.     He is not interested in undergoing a DaTscan at this time.    Moodiness has increased.     A fall last week resulted in a hand injury with skin peeling and bleeding, exacerbated by blood thinner medication. The wound was treated with super glue but has been slow to heal.    Currently, he is awaiting the placement of a pacemaker and defibrillator by his cardiologist and is considering the Watchman procedure. He remains on blood thinners.    SOCIAL HISTORY  Marital Status:  for 54 years      MEDICATIONS  CURRENT MEDS:  Lexapro  PREVIOUS MEDS:  Coumadin  Xarelto      Subjective      Past Medical History:   Past Medical History:   Diagnosis Date    A-fib     Arthritis     Heart disease     Hypertension     Low back pain     MI, old     possible    Wears glasses        Past Surgical History:   Past Surgical History:   Procedure Laterality Date    CARPAL TUNNEL RELEASE Left 2017    Procedure: LEFT CARPAL  TUNNEL RELEASE;  Surgeon: Adrián Hayward MD;  Location: [facility];  Service:     LEG SURGERY Right     TRIPPED ON SOMETHING, WOUND BECAME INFECTED, I&D WITH PACKING    REPLACEMENT TOTAL KNEE Bilateral        Family History:   Family History   Problem Relation Age of Onset    No Known Problems Mother     No Known Problems Father        Social History:   Social History     Socioeconomic History    Marital status:    Tobacco Use    Smoking status: Never    Smokeless tobacco: Never   Substance and Sexual Activity    Alcohol use: No    Drug use: No    Sexual activity: Defer       Medications:     Current Outpatient Medications:     allopurinol (ZYLOPRIM) 300 MG tablet, Take 1 tablet by mouth Daily., Disp: , Rfl:     atorvastatin (LIPITOR) 40 MG tablet, Take 0.5 tablets by mouth Every Night., Disp: , Rfl: 1    carbidopa-levodopa (SINEMET)  MG per tablet, Take 2 tablets by mouth 3 (Three) Times a Day., Disp: 180 tablet, Rfl: 3    cyclobenzaprine (FLEXERIL) 10 MG tablet, Take 1 tablet by mouth., Disp: , Rfl:     erythromycin (ROMYCIN) 5 MG/GM ophthalmic ointment, APPLY 1 APPLICATION TO RIGHT EYE EVERY NIGHT., Disp: , Rfl:     finasteride (PROSCAR) 5 MG tablet, Take 1 tablet by mouth Daily., Disp: , Rfl:     furosemide (LASIX) 20 MG tablet, Take 1 tablet by mouth As Needed., Disp: , Rfl: 5    HYDROcodone-acetaminophen (NORCO) 7.5-325 MG per tablet, 0.5 tablets., Disp: , Rfl:     metoprolol succinate XL (TOPROL-XL) 50 MG 24 hr tablet, Take 1 tablet by mouth Daily., Disp: , Rfl:     mupirocin (BACTROBAN) 2 % ointment, Apply  topically to the appropriate area as directed 3 (Three) Times a Day., Disp: , Rfl:     predniSONE (DELTASONE) 20 MG tablet, Take 1 tablet by mouth As Needed., Disp: , Rfl:     rivaroxaban (XARELTO) 15 MG tablet, Take 1 tablet by mouth Daily., Disp: , Rfl:     tamsulosin (FLOMAX) 0.4 MG capsule 24 hr capsule, Take 1 capsule by mouth Daily., Disp: , Rfl:     Allergies:   Allergies   Allergen  Reactions    Contrast Dye (Echo Or Unknown Ct/Mr)      Given during heart cath caused itching and swelling    Red Dye #40 (Allura Red)      palpitations    Sulfa Antibiotics Itching    Iodinated Contrast Media Swelling       PHQ-9 Total Score:     STORMY Fall Risk Assessment was completed, and patient is at HIGH risk for falls. Assessment completed on:4/1/2025    Objective     Physical Exam:     Neurological Exam  Mental Status  Awake, alert and oriented to person, place and time. Recent and remote memory are intact. Speech is normal. Language is fluent with no aphasia. Attention and concentration are normal. Fund of knowledge is appropriate for level of education.    Cranial Nerves  CN II: Visual acuity is normal.  CN III, IV, VI: Extraocular movements intact bilaterally. Pupils equal round and reactive to light bilaterally.  CN V: Facial sensation is normal.  CN VII: Full and symmetric facial movement.  CN IX, X: Palate elevates symmetrically  CN XI: Shoulder shrug strength is normal.  CN XII: Tongue midline without atrophy or fasciculations.    Motor  Normal muscle bulk throughout. No fasciculations present. Normal muscle tone. The following abnormal movements were seen: Strength is 5/5 throughout all four extremities.  Coarse tremor in upper extremities.    Sensory  Sensation is intact to light touch, pinprick, vibration and proprioception in all four extremities.    Reflexes                                            Right                      Left  Brachioradialis                    2+                         2+  Biceps                                 2+                         2+  Triceps                                2+                         2+  Finger flex                           2+                         2+  Hamstring                            2+                         2+  Patellar                                2+                         2+  Achilles                                2+                "          2+    Coordination    Finger-to-nose, rapid alternating movements and heel-to-shin normal bilaterally without dysmetria.    Gait  Casual gait: Shuffling gait.        Vital Signs:   Vitals:    04/01/25 1158   BP: 98/58   Pulse: 64   SpO2: 95%   Weight: 79.8 kg (176 lb)   Height: 180.3 cm (71\")     Body mass index is 24.55 kg/m².     Results:   Results       Imaging:   CT Maxillofacial Without Contrast  Result Date: 1/29/2025          1.   No acute fracture.      2.   Small hematoma in the right cheek and around the right orbit.       Created by: Deirdre Mcbride MD  Signed by: Deirdre Mcbride MD  Signed on: 1/29/2025 10:55 PM  Location: [id number]            MRI Brain Without Contrast  Result Date: 1/14/2025   1. No acute CVA.  2. No intra-axial mass/vasogenic edema.  3. No acute to subacute intra-axial hematoma.  4. Small amount of supratentorial chronic small vessel white matter ischemia.  Created by: Rafi Rodriges DO  Signed by: Rafi Rodriges DO  Signed on: 1/14/2025 10:17 PM  Location: [id number]            US Carotid Bilateral  Result Date: 1/14/2025   Less than 50% diameter reduction is suggested of the internal carotid arteries bilaterally.  Mild amount of plaque is noted within the carotid bulb and proximal internal carotid arteries bilaterally.    Diagnostic criteria recommended by SRU (Society of Radiologists in Ultrasound) for Internal Carotid Artery Stenosis interpretation published from the Inter societal Accreditation Commission on Vascular Testing guidelines, Oct 2021.\"     Created by: Dwight Jordan  Signed by: Dwight Jordan  Signed on: 1/14/2025 5:14 PM  Location: [id number]               Labs:   No results found for: \"CMP\", \"PROTEIN\", \"ANTIMOGAB\", \"BYEPBU5MAXO\", \"JCVRESULT\", \"QUANTTBGOLD\", \"CBCDIF\", \"IGGALBSER\"     Assessment / Plan      Assessment/Plan:   Diagnoses and all orders for this visit:    1. Tremor (Primary)  Comments:  Increase carbidopa-levodopa    Other orders  -     carbidopa-levodopa (SINEMET)  MG per " tablet; Take 2 tablets by mouth 3 (Three) Times a Day.  Dispense: 180 tablet; Refill: 3         Assessment & Plan  1. Tremors.  The patient continues to experience tremors and sometimes forgets the midday dose of medication. The current medication dosage will be increased to 2 tablets, to be taken 3 times daily. He was informed that the increased dosage might cause nausea or stomach upset, and it is recommended to eat a small amount of food with each dose. If he experiences any issues with the increased dosage, such as persistent nausea, he should contact the office.    2. Mood changes.  He has been experiencing moodiness and depression. He mentioned these symptoms to his heart doctor recently. No specific changes to his current treatment plan were discussed during this visit.    3. Back pain.  He reports significant back pain, which may be contributing to his leaning forward while walking. No new treatment was discussed during this visit.    4. Hand injury.  He sustained a hand injury last week, resulting in skin peeling and bleeding due to his blood thinner medication. The wound was treated with super glue, but it has been taking a long time to heal.      Follow-up  The patient will follow up in 3 months.    Patient Education:     Reviewed medications, potential side effects and signs and symptoms to report. Discussed risk versus benefits of treatment plan with patient and/or family-including medications, labs and radiology that may be ordered. Addressed questions and concerns during visit. Patient and/or family verbalized understanding and agree with plan. Instructed to call the office with any questions and report to ER with any life-threatening symptoms.     Follow Up:   Return in about 3 months (around 7/1/2025).    I spent 30 minutes caring for Lauro on this date of service. This time includes time spent by me in the following activities: preparing for the visit, reviewing tests, performing a medically  appropriate examination and/or evaluation, counseling and educating the patient/family/caregiver, and documenting information in the medical record.        During this visit the following were done:  Labs Reviewed []    Labs Ordered []    Radiology Reports Reviewed [x]    Radiology Ordered []    PCP Records Reviewed []    Referring Provider Records Reviewed []    ER Records Reviewed [x]    Hospital Records Reviewed []    History Obtained From Family []    Radiology Images Reviewed []    Other Reviewed []    Records Requested []      Patient or patient representative verbalized consent for the use of Ambient Listening during the visit with  DEON Avila for chart documentation. 4/1/2025  11:47 EDT    DEON Avila   [facility] [place] [facility] NEUROLOGY  [address] [address] [phone]

## 2025-04-04 ENCOUNTER — TELEPHONE (OUTPATIENT)
Dept: NEUROLOGY | Facility: CLINIC | Age: 80
End: 2025-04-04
Payer: MEDICARE

## 2025-04-04 RX ORDER — ONDANSETRON 4 MG/1
4 TABLET, FILM COATED ORAL DAILY PRN
Qty: 30 TABLET | Refills: 1 | Status: SHIPPED | OUTPATIENT
Start: 2025-04-04 | End: 2026-04-04

## 2025-04-04 NOTE — TELEPHONE ENCOUNTER
PT SPOUSE CALLING - SHE SAYS HE HAS BEEN SICK SINCE THE INCREASE IN MEDICATION MADE AT LAST OV 4/1/25.  SHE SAYS SHE WAS TO CALL BACK IN AND PROVIDER WOULD SEND SOMETHING TO RX FOR HIM.    RX IS:    [facility] - [address] [phone] [facility] [phone] FX

## 2025-04-04 NOTE — TELEPHONE ENCOUNTER
"Relayed Provider message to Pt spouse,    \"I sent a prescription for Zofran to [facility] in [place].  Have him take one of the tablets about 30 minutes before taking the carbidopa-levodopa and see if that will help with the stomach upset.\"    Pt spouse verbalized understanding.    "

## 2025-04-04 NOTE — TELEPHONE ENCOUNTER
I sent a prescription for Zofran to [facility] in [place].  Have him take one of the tablets about 30 minutes before taking the carbidopa-levodopa and see if that will help with the stomach upset.

## 2025-05-19 ENCOUNTER — TRANSCRIBE ORDERS (OUTPATIENT)
Dept: ADMINISTRATIVE | Facility: HOSPITAL | Age: 80
End: 2025-05-19
Payer: MEDICARE

## 2025-05-19 DIAGNOSIS — I50.22 CHRONIC SYSTOLIC HEART FAILURE: ICD-10-CM

## 2025-05-19 DIAGNOSIS — I48.19 PERSISTENT ATRIAL FIBRILLATION: Primary | ICD-10-CM

## 2025-05-19 DIAGNOSIS — R53.83 TIREDNESS: ICD-10-CM

## 2025-05-23 ENCOUNTER — HOSPITAL ENCOUNTER (OUTPATIENT)
Dept: CARDIOLOGY | Facility: HOSPITAL | Age: 80
Discharge: HOME | End: 2025-05-23
Payer: MEDICARE

## 2025-05-23 DIAGNOSIS — I48.19 PERSISTENT ATRIAL FIBRILLATION: ICD-10-CM

## 2025-05-23 DIAGNOSIS — R53.83 TIREDNESS: ICD-10-CM

## 2025-05-23 DIAGNOSIS — I50.22 CHRONIC SYSTOLIC HEART FAILURE: ICD-10-CM

## 2025-05-23 LAB
AORTIC DIMENSIONLESS INDEX: 0.45 (DI)
AV MEAN PRESS GRAD SYS DOP V1V2: 3.5 MMHG
AV VMAX SYS DOP: 124.5 CM/SEC
BH CV ECHO MEAS - ACS: 1.8 CM
BH CV ECHO MEAS - AI P1/2T: 371.6 MSEC
BH CV ECHO MEAS - AO MAX PG: 6.2 MMHG
BH CV ECHO MEAS - AO ROOT DIAM: 3.4 CM
BH CV ECHO MEAS - AO V2 VTI: 21.6 CM
BH CV ECHO MEAS - AVA(I,D): 1.86 CM2
BH CV ECHO MEAS - EDV(CUBED): 150.6 ML
BH CV ECHO MEAS - EDV(MOD-SP2): 48.1 ML
BH CV ECHO MEAS - EDV(MOD-SP4): 80.3 ML
BH CV ECHO MEAS - EF(MOD-SP2): 20.6 %
BH CV ECHO MEAS - EF(MOD-SP4): 40.8 %
BH CV ECHO MEAS - ESV(MOD-SP2): 38.2 ML
BH CV ECHO MEAS - ESV(MOD-SP4): 47.5 ML
BH CV ECHO MEAS - IVS/LVPW: 0.88 CM
BH CV ECHO MEAS - IVSD: 1.04 CM
BH CV ECHO MEAS - LA DIMENSION: 4.8 CM
BH CV ECHO MEAS - LAT PEAK E' VEL: 4 CM/SEC
BH CV ECHO MEAS - LV DIASTOLIC VOL/BSA (35-75): 40.2 CM2
BH CV ECHO MEAS - LV MASS(C)D: 232 GRAMS
BH CV ECHO MEAS - LV MAX PG: 1.59 MMHG
BH CV ECHO MEAS - LV MEAN PG: 1 MMHG
BH CV ECHO MEAS - LV SYSTOLIC VOL/BSA (12-30): 23.8 CM2
BH CV ECHO MEAS - LV V1 MAX: 63 CM/SEC
BH CV ECHO MEAS - LV V1 VTI: 9.7 CM
BH CV ECHO MEAS - LVIDD: 5.3 CM
BH CV ECHO MEAS - LVOT AREA: 4.2 CM2
BH CV ECHO MEAS - LVOT DIAM: 2.3 CM
BH CV ECHO MEAS - LVPWD: 1.18 CM
BH CV ECHO MEAS - MED PEAK E' VEL: 3.6 CM/SEC
BH CV ECHO MEAS - MV A MAX VEL: 38.1 CM/SEC
BH CV ECHO MEAS - MV DEC SLOPE: 135 CM/SEC2
BH CV ECHO MEAS - MV E MAX VEL: 71.1 CM/SEC
BH CV ECHO MEAS - MV E/A: 1.87
BH CV ECHO MEAS - MV P1/2T: 127.4 MSEC
BH CV ECHO MEAS - MVA(P1/2T): 1.73 CM2
BH CV ECHO MEAS - PA ACC TIME: 0.09 SEC
BH CV ECHO MEAS - PA V2 MAX: 79.6 CM/SEC
BH CV ECHO MEAS - RAP SYSTOLE: 10 MMHG
BH CV ECHO MEAS - RVDD: 3.1 CM
BH CV ECHO MEAS - RVSP: 59 MMHG
BH CV ECHO MEAS - SV(LVOT): 40.1 ML
BH CV ECHO MEAS - SV(MOD-SP2): 9.9 ML
BH CV ECHO MEAS - SV(MOD-SP4): 32.8 ML
BH CV ECHO MEAS - SVI(LVOT): 20.1 ML/M2
BH CV ECHO MEAS - SVI(MOD-SP2): 5 ML/M2
BH CV ECHO MEAS - SVI(MOD-SP4): 16.4 ML/M2
BH CV ECHO MEAS - TAPSE (>1.6): 1.57 CM
BH CV ECHO MEAS - TR MAX PG: 49 MMHG
BH CV ECHO MEAS - TR MAX VEL: 350 CM/SEC
BH CV ECHO MEASUREMENTS AVERAGE E/E' RATIO: 18.71
LEFT ATRIUM VOLUME INDEX: 30.2 ML/M2
LV EF BIPLANE MOD: 34 %

## 2025-05-23 PROCEDURE — 93306 TTE W/DOPPLER COMPLETE: CPT

## 2025-05-23 PROCEDURE — 93356 MYOCRD STRAIN IMG SPCKL TRCK: CPT

## 2025-05-27 LAB
AORTIC DIMENSIONLESS INDEX: 0.45 (DI)
AV MEAN PRESS GRAD SYS DOP V1V2: 3.5 MMHG
AV VMAX SYS DOP: 124.5 CM/SEC
BH CV ECHO MEAS - ACS: 1.8 CM
BH CV ECHO MEAS - AI P1/2T: 371.6 MSEC
BH CV ECHO MEAS - AO MAX PG: 6.2 MMHG
BH CV ECHO MEAS - AO ROOT DIAM: 3.4 CM
BH CV ECHO MEAS - AO V2 VTI: 21.6 CM
BH CV ECHO MEAS - AVA(I,D): 1.86 CM2
BH CV ECHO MEAS - EDV(CUBED): 150.6 ML
BH CV ECHO MEAS - EDV(MOD-SP2): 48.1 ML
BH CV ECHO MEAS - EDV(MOD-SP4): 80.3 ML
BH CV ECHO MEAS - EF(MOD-SP2): 20.6 %
BH CV ECHO MEAS - EF(MOD-SP4): 40.8 %
BH CV ECHO MEAS - ESV(MOD-SP2): 38.2 ML
BH CV ECHO MEAS - ESV(MOD-SP4): 47.5 ML
BH CV ECHO MEAS - IVS/LVPW: 0.88 CM
BH CV ECHO MEAS - IVSD: 1.04 CM
BH CV ECHO MEAS - LA DIMENSION: 4.8 CM
BH CV ECHO MEAS - LAT PEAK E' VEL: 4 CM/SEC
BH CV ECHO MEAS - LV DIASTOLIC VOL/BSA (35-75): 40.2 CM2
BH CV ECHO MEAS - LV MASS(C)D: 232 GRAMS
BH CV ECHO MEAS - LV MAX PG: 1.59 MMHG
BH CV ECHO MEAS - LV MEAN PG: 1 MMHG
BH CV ECHO MEAS - LV SYSTOLIC VOL/BSA (12-30): 23.8 CM2
BH CV ECHO MEAS - LV V1 MAX: 63 CM/SEC
BH CV ECHO MEAS - LV V1 VTI: 9.7 CM
BH CV ECHO MEAS - LVIDD: 5.3 CM
BH CV ECHO MEAS - LVOT AREA: 4.2 CM2
BH CV ECHO MEAS - LVOT DIAM: 2.3 CM
BH CV ECHO MEAS - LVPWD: 1.18 CM
BH CV ECHO MEAS - MED PEAK E' VEL: 3.6 CM/SEC
BH CV ECHO MEAS - MV A MAX VEL: 38.1 CM/SEC
BH CV ECHO MEAS - MV DEC SLOPE: 135 CM/SEC2
BH CV ECHO MEAS - MV E MAX VEL: 71.1 CM/SEC
BH CV ECHO MEAS - MV E/A: 1.87
BH CV ECHO MEAS - MV P1/2T: 127.4 MSEC
BH CV ECHO MEAS - MVA(P1/2T): 1.73 CM2
BH CV ECHO MEAS - PA ACC TIME: 0.09 SEC
BH CV ECHO MEAS - PA V2 MAX: 79.6 CM/SEC
BH CV ECHO MEAS - RVDD: 3.1 CM
BH CV ECHO MEAS - SV(LVOT): 40.1 ML
BH CV ECHO MEAS - SV(MOD-SP2): 9.9 ML
BH CV ECHO MEAS - SV(MOD-SP4): 32.8 ML
BH CV ECHO MEAS - SVI(LVOT): 20.1 ML/M2
BH CV ECHO MEAS - SVI(MOD-SP2): 5 ML/M2
BH CV ECHO MEAS - SVI(MOD-SP4): 16.4 ML/M2
BH CV ECHO MEAS - TAPSE (>1.6): 1.57 CM
BH CV ECHO MEAS - TR MAX PG: 49 MMHG
BH CV ECHO MEAS - TR MAX VEL: 350 CM/SEC
BH CV ECHO MEASUREMENTS AVERAGE E/E' RATIO: 18.71
LEFT ATRIUM VOLUME INDEX: 30.2 ML/M2
LV EF BIPLANE MOD: 34 %

## 2025-08-07 ENCOUNTER — OFFICE VISIT (OUTPATIENT)
Facility: HOSPITAL | Age: 80
End: 2025-08-07
Payer: MEDICARE

## 2025-08-07 VITALS
WEIGHT: 174 LBS | DIASTOLIC BLOOD PRESSURE: 60 MMHG | SYSTOLIC BLOOD PRESSURE: 116 MMHG | OXYGEN SATURATION: 97 % | HEIGHT: 71 IN | HEART RATE: 63 BPM | BODY MASS INDEX: 24.36 KG/M2

## 2025-08-07 DIAGNOSIS — R25.1 TREMOR: Primary | ICD-10-CM

## 2025-08-07 DIAGNOSIS — F41.9 ANXIETY: ICD-10-CM

## 2025-08-07 PROCEDURE — G0463 HOSPITAL OUTPT CLINIC VISIT: HCPCS | Performed by: NURSE PRACTITIONER

## 2025-08-07 RX ORDER — CARBIDOPA AND LEVODOPA 25; 100 MG/1; MG/1
1 TABLET ORAL 3 TIMES DAILY
Qty: 180 TABLET | Refills: 3 | Status: SHIPPED | OUTPATIENT
Start: 2025-08-07

## 2025-08-07 RX ORDER — ESCITALOPRAM OXALATE 10 MG/1
10 TABLET ORAL DAILY
Qty: 30 TABLET | Refills: 6 | Status: SHIPPED | OUTPATIENT
Start: 2025-08-07 | End: 2026-08-07

## 2025-08-07 RX ORDER — RIVAROXABAN 15 MG/1
15 TABLET, FILM COATED ORAL DAILY
COMMUNITY
Start: 2025-07-03

## 2025-08-07 RX ORDER — HYDROXYZINE HYDROCHLORIDE 25 MG/1
25 TABLET, FILM COATED ORAL
COMMUNITY
Start: 2025-07-22

## 2025-08-18 RX ORDER — ONDANSETRON 4 MG/1
4 TABLET, FILM COATED ORAL DAILY PRN
Qty: 90 TABLET | Refills: 3 | Status: SHIPPED | OUTPATIENT
Start: 2025-08-18 | End: 2026-08-18

## (undated) DEVICE — STCKNT STRL 4X48 IN

## (undated) DEVICE — SUT ETHLN 4/0 PS2 18IN 1667H

## (undated) DEVICE — DISPOSABLE BIPOLAR FORCEPS 5 1/2" (14CM) SEMKIN, 0.7MM TIP AND 12 FT. (3.6M) CABLE: Brand: KIRWAN

## (undated) DEVICE — ENCORE® LATEX MICRO SIZE 8, STERILE LATEX POWDER-FREE SURGICAL GLOVE: Brand: ENCORE

## (undated) DEVICE — SPNG GZ STRL 2S 4X4 12PLY

## (undated) DEVICE — ANTIBACTERIAL UNDYED BRAIDED (POLYGLACTIN 910), SYNTHETIC ABSORBABLE SUTURE: Brand: COATED VICRYL

## (undated) DEVICE — CANN NASL CO2 DIVIDED A/

## (undated) DEVICE — SPNG GZ WOVN 4X4IN 12PLY 10/BX STRL

## (undated) DEVICE — ENCORE® LATEX MICRO SIZE 7, STERILE LATEX POWDER-FREE SURGICAL GLOVE: Brand: ENCORE

## (undated) DEVICE — 2963 MEDIPORE SOFT CLOTH TAPE 3 IN X 10 YD 12 RLS/CS: Brand: 3M™ MEDIPORE™

## (undated) DEVICE — ENCORE® LATEX MICRO SIZE 6.5, STERILE LATEX POWDER-FREE SURGICAL GLOVE: Brand: ENCORE

## (undated) DEVICE — PK EXTREM UPPR 10

## (undated) DEVICE — APPL CHLORAPREP W/TINT 26ML BLU

## (undated) DEVICE — DRSNG TELFA PAD NONADH STR 1S 3X8IN

## (undated) DEVICE — SYR CONTRL LUERLOK 10CC

## (undated) DEVICE — BNDG GZ SOF-FORM CONFRM 2X75IN LF STRL